# Patient Record
Sex: FEMALE | Race: WHITE
[De-identification: names, ages, dates, MRNs, and addresses within clinical notes are randomized per-mention and may not be internally consistent; named-entity substitution may affect disease eponyms.]

---

## 2018-07-28 ENCOUNTER — HOSPITAL ENCOUNTER (EMERGENCY)
Dept: HOSPITAL 97 - ER | Age: 23
Discharge: HOME | End: 2018-07-28
Payer: COMMERCIAL

## 2018-07-28 DIAGNOSIS — R07.9: Primary | ICD-10-CM

## 2018-07-28 DIAGNOSIS — F90.9: ICD-10-CM

## 2018-07-28 LAB
ALBUMIN SERPL BCP-MCNC: 4 G/DL (ref 3.4–5)
ALP SERPL-CCNC: 41 U/L (ref 45–117)
ALT SERPL W P-5'-P-CCNC: 24 U/L (ref 12–78)
AST SERPL W P-5'-P-CCNC: 18 U/L (ref 15–37)
BUN BLD-MCNC: 10 MG/DL (ref 7–18)
GLUCOSE SERPLBLD-MCNC: 91 MG/DL (ref 74–106)
HCT VFR BLD CALC: 38.9 % (ref 36–45)
INR BLD: 1.02
LYMPHOCYTES # SPEC AUTO: 2.2 K/UL (ref 0.7–4.9)
MAGNESIUM SERPL-MCNC: 2.1 MG/DL (ref 1.8–2.4)
MCH RBC QN AUTO: 32.8 PG (ref 27–35)
MCV RBC: 93.3 FL (ref 80–100)
METHAMPHET UR QL SCN: NEGATIVE
NT-PROBNP SERPL-MCNC: 55 PG/ML (ref ?–125)
PMV BLD: 9.3 FL (ref 7.6–11.3)
POTASSIUM SERPL-SCNC: 3.8 MMOL/L (ref 3.5–5.1)
RBC # BLD: 4.17 M/UL (ref 3.86–4.86)
THC SERPL-MCNC: NEGATIVE NG/ML

## 2018-07-28 PROCEDURE — 85610 PROTHROMBIN TIME: CPT

## 2018-07-28 PROCEDURE — 71045 X-RAY EXAM CHEST 1 VIEW: CPT

## 2018-07-28 PROCEDURE — 85025 COMPLETE CBC W/AUTO DIFF WBC: CPT

## 2018-07-28 PROCEDURE — 83880 ASSAY OF NATRIURETIC PEPTIDE: CPT

## 2018-07-28 PROCEDURE — 84484 ASSAY OF TROPONIN QUANT: CPT

## 2018-07-28 PROCEDURE — 81003 URINALYSIS AUTO W/O SCOPE: CPT

## 2018-07-28 PROCEDURE — 99285 EMERGENCY DEPT VISIT HI MDM: CPT

## 2018-07-28 PROCEDURE — 93005 ELECTROCARDIOGRAM TRACING: CPT

## 2018-07-28 PROCEDURE — 80307 DRUG TEST PRSMV CHEM ANLYZR: CPT

## 2018-07-28 PROCEDURE — 83735 ASSAY OF MAGNESIUM: CPT

## 2018-07-28 PROCEDURE — 85730 THROMBOPLASTIN TIME PARTIAL: CPT

## 2018-07-28 PROCEDURE — 80076 HEPATIC FUNCTION PANEL: CPT

## 2018-07-28 PROCEDURE — 36415 COLL VENOUS BLD VENIPUNCTURE: CPT

## 2018-07-28 PROCEDURE — 81025 URINE PREGNANCY TEST: CPT

## 2018-07-28 PROCEDURE — 80048 BASIC METABOLIC PNL TOTAL CA: CPT

## 2018-07-28 NOTE — ER
Nurse's Notes                                                                                     

 [facility]                                                                

Name: Marie Monroe                                                                              

Age: 23 yrs                                                                                       

Sex: Female                                                                                       

: 1995                                                                                   

MRN: [id number]                                                                                   

Arrival Date: 2018                                                                          

Time: :                                                                                       

Account#: [id number]                                                                            

Bed 8                                                                                             

Private MD: Moe Callejas                                                                        

Diagnosis: Chest pain, unspecified                                                                

                                                                                                  

Presentation:                                                                                     

                                                                                             

09:31 Presenting complaint: Patient states: intermittent sharp pain under L breast that is    ss  

      worse when taking a deep breath x 1-2 months. Pt reports that her PCP believed that it      

      may be reflux so she took her first dose of antacid yesterday. Pt had the pain today,       

      but when she was on her way to eat breakfast suddenly became weak all over that lasted      

      30 seconds- 1 minute which made her decide to be evaluated in ER today. Transition of       

      care: patient was not received from another setting of care. Onset of symptoms was 2018. Risk Assessment: Do you want to hurt yourself or someone else? Patient            

      reports no desire to harm self or others. Initial Sepsis Screen: Does the patient meet      

      any 2 criteria? No. Patient's initial sepsis screen is negative. Does the patient have      

      a suspected source of infection? No. Patient's initial sepsis screen is negative. Care      

      prior to arrival: None.                                                                     

09:31 Method Of Arrival: Ambulatory                                                           ss  

09:31 Acuity: ELBA 3                                                                           ss  

                                                                                                  

Triage Assessment:                                                                                

10:10 General: Appears in no apparent distress. comfortable, Behavior is cooperative,         ph  

      appropriate for age, anxious. Pain: Complains of pain in diaphragm, left lateral            

      anterior chest and left breast Quality of pain is described as sharp, Aggravated by         

      repositioning, deep breathing. Neuro: Level of Consciousness is awake, alert, obeys         

      commands, Oriented to person, place, time, situation. Cardiovascular: Reports chest         

      pain, palpitations, shortness of breath, Denies fatigue, nausea, vomiting, Capillary        

      refill < 3 seconds in bilateral fingers Patient's skin is warm and dry. Rhythm is sinus     

      rhythm Chest pain quality is sharp, is located in left anterior chest wall is               

      aggravated by breathing. Respiratory: Reports pain with movement pain with respiration      

      Airway is patent Respiratory effort is even, unlabored, Respiratory pattern is regular,     

      symmetrical, Denies cough. Derm: Skin is intact, is healthy with good turgor, Skin is       

      pink, warm \T\ dry. Musculoskeletal: Circulation, motion, and sensation intact. Range of    

      motion: intact in all extremities.                                                          

                                                                                                  

Historical:                                                                                       

- Allergies:                                                                                      

09:34 No Known Allergies;                                                                     ss  

- Home Meds:                                                                                      

09:34 vyvanse (currently on hold due to CP symptoms per PCP) [Active];                        ss  

09:35 birth control [Active];                                                                 ss  

- PMHx:                                                                                           

09:34 ADD/ADHD;                                                                               ss  

- PSHx:                                                                                           

09:34 None;                                                                                   ss  

                                                                                                  

- Immunization history:: Adult Immunizations up to date.                                          

- Social history:: Smoking status: Patient/guardian denies using tobacco.                         

- Ebola Screening: : Patient denies exposure to infectious person Patient denies travel           

  to an Ebola-affected area in the 21 days before illness onset.                                  

                                                                                                  

                                                                                                  

Screening:                                                                                        

10:10 Abuse screen: Denies threats or abuse. Denies injuries from another. Nutritional        ph  

      screening: No deficits noted. Tuberculosis screening: No symptoms or risk factors           

      identified. Fall Risk None identified.                                                      

                                                                                                  

Assessment:                                                                                       

10:17 General: See triage assessment.                                                         ph  

11:20 Reassessment: Patient appears in no apparent distress at this time. Patient and/or      ph  

      family updated on plan of care and expected duration. Pain level reassessed. Patient is     

      alert, oriented x 3, equal unlabored respirations, skin warm/dry/pink. Pt ambulated to      

      restroom, gait steady, denies dizziness, denies pain at this time, awaiting radiology       

      results, family at bedside, VSS.                                                            

12:30 Reassessment: Patient appears in no apparent distress at this time. Patient and/or      ph  

      family updated on plan of care and expected duration. Pain level reassessed. Patient is     

      alert, oriented x 3, equal unlabored respirations, skin warm/dry/pink. Pt d/c home with     

      family Patient denies pain at this time. Patient states feeling better.                     

                                                                                                  

Vital Signs:                                                                                      

09:34  / 89; Pulse 93; Resp 16; Pulse Ox 100% ; Weight 57.15 kg; Height 5 ft. 3 in.     ss  

      (160.02 cm); Pain 2/10;                                                                     

09:45 Temp 98.2(TE);                                                                          ss  

10:19  / 98; Pulse 85; Resp 22; Pulse Ox 100% on R/A;                                   ph  

11:22  / 82; Pulse 69; Resp 18; Pulse Ox 99% on R/A; Pain 0/10;                         ph  

12:30  / 78; Pulse 72; Resp 18; Temp 98.0; Pulse Ox 99% on R/A;                         ph  

09:34 Body Mass Index 22.32 (57.15 kg, 160.02 cm)                                             ss  

                                                                                                  

Vitals:                                                                                           

10:19 Cardiac Rhythm Assessment Sinus rhythm.                                                 ph  

                                                                                                  

ED Course:                                                                                        

09:23 Patient arrived in ED.                                                                  as  

09:23 Moe Callejas MD is Private Physician.                                                as  

09:31 Gerardo Camarena MD is Attending Physician.                                              kdr 

09:33 Triage completed.                                                                       ss  

09:34 Arm band placed on right wrist.                                                         ss  

09:39 EKG done, by ED staff, reviewed by Gerardo Camarena MD.                                    em1 

09:44 Jocelyn Gomez, RN is Primary Nurse.                                                    ph  

09:49 XRAY Chest (1 view) In Process Unspecified.                                             EDMS

10:14 Inserted saline lock: 22 gauge in left antecubital area, using aseptic technique. Blood ss  

      collected. Patient maintains SpO2 saturation greater than 95% on room air.                  

10:18 Patient has correct armband on for positive identification. Placed in gown. Bed in low  ph  

      position. Call light in reach. Side rails up X 1. Cardiac monitor on. Pulse ox on. NIBP     

      on. Warm blanket given. Verbal reassurance given.                                           

12:36 No provider procedures requiring assistance completed. IV discontinued, intact,         ph  

      bleeding controlled, No redness/swelling at site. Pressure dressing applied.                

                                                                                                  

Administered Medications:                                                                         

No medications were administered                                                                  

                                                                                                  

                                                                                                  

Outcome:                                                                                          

12:13 Discharge ordered by MD.                                                                kdr 

12:36 Patient left the ED.                                                                    ph  

12:36 Discharged to home ambulatory, with family.                                             ph  

12:36 Condition: good                                                                             

12:36 Discharge instructions given to patient, Instructed on discharge instructions, follow       

      up and referral plans. medication usage, Demonstrated understanding of instructions,        

      follow-up care, medications, Prescriptions given X 2.                                       

                                                                                                  

Signatures:                                                                                       

Dispatcher [employer]                           EDMS                                                 

Gerardo Camarena MD MD   kdr                                                  

Adeline Nolasco Eric                               em1                                                  

Karin Rhodes, YAMEL                      RN                                                      

Jocelyn Gomez, YAMEL                      RN   ph                                                   

                                                                                                  

**************************************************************************************************

## 2018-07-28 NOTE — EDPHYS
Physician Documentation                                                                           

 [facility]                                                                

Name: Marie Monroe                                                                              

Age: 23 yrs                                                                                       

Sex: Female                                                                                       

: 1995                                                                                   

MRN: [id number]                                                                                   

Arrival Date: 2018                                                                          

Time: 09:23                                                                                       

Account#: [id number]                                                                            

Bed 8                                                                                             

Private MD: Moe Callejas                                                                        

ED Physician Gerardo Camarena                                                                       

HPI:                                                                                              

                                                                                             

16:37 This 23 yrs old  Female presents to ER via Ambulatory with complaints of Chest kdr 

      Pain.                                                                                       

16:37 The patient or guardian reports chest pain that is located primarily in the anterior    kdr 

      chest wall, At the inferior costal margin below nipple. The pain does not radiate.          

      Associated signs and symptoms: The patient has no apparent associated signs or              

      symptoms, Pertinent positives: Hard to get a full breath. Began with mild discomfort        

      then she felt a pop and the pain became much worse.. The chest pain is described as         

      sharp, stabbing. Duration: The patient or guardian reports multiple episodes, that are      

      intermittent, that wax and wane, with no pattern. Modifying factors: The symptoms are       

      alleviated by remaining still, Shallow breathing, holding still. the symptoms are           

      aggravated by breathing, cough, deep breath, movement, twisting torso. Severity of          

      pain: At its worst the pain was moderate severe just prior to arrival, in the emergency     

      department the pain has improved moderately. The patient has experienced similar            

      episodes in the past, The has been ongoing for some time but not as bad or persistent       

      as today..                                                                                  

                                                                                                  

Historical:                                                                                       

- Allergies:                                                                                      

09:34 No Known Allergies;                                                                     ss  

- Home Meds:                                                                                      

09:34 vyvanse (currently on hold due to CP symptoms per PCP) [Active];                        ss  

09:35 birth control [Active];                                                                 ss  

- PMHx:                                                                                           

09:34 ADD/ADHD;                                                                               ss  

- PSHx:                                                                                           

09:34 None;                                                                                   ss  

                                                                                                  

- Immunization history:: Adult Immunizations up to date.                                          

- Social history:: Smoking status: Patient/guardian denies using tobacco.                         

- Ebola Screening: : Patient denies exposure to infectious person Patient denies travel           

  to an Ebola-affected area in the 21 days before illness onset.                                  

                                                                                                  

                                                                                                  

ROS:                                                                                              

16:37 Constitutional: Negative for fever, chills, and weight loss, Eyes: Negative for injury, kdr 

      pain, redness, and discharge, ENT: Negative for injury, pain, and discharge, Neck:          

      Negative for injury, pain, and swelling, Respiratory: Negative for shortness of breath,     

      cough, wheezing, and pleuritic chest pain, Abdomen/GI: Negative for abdominal pain,         

      nausea, vomiting, diarrhea, and constipation, Back: Negative for injury and pain, :       

      Negative for injury, bleeding, discharge, and swelling, MS/Extremity: Negative for          

      injury and deformity, Skin: Negative for injury, rash, and discoloration, Neuro:            

      Negative for headache, weakness, numbness, tingling, and seizure activity. Psych:           

      Negative for depression, anxiety, suicide ideation, homicidal ideation, and                 

      hallucinations, Allergy/Immunology: Negative for hives, rash, and allergies, Endocrine:     

      Negative for neck swelling, polydipsia, polyuria, polyphagia, and marked weight             

      changes, Hematologic/Lymphatic: Negative for swollen nodes, abnormal bleeding, and          

      unusual bruising.                                                                           

16:37 Cardiovascular: Positive for chest pain, with cough, with movement, of the left             

      anterior inferior chest pain, Negative for edema, orthopnea, palpitations, paroxysmal       

      nocturnal dyspnea.                                                                          

                                                                                                  

Exam:                                                                                             

16:37 Constitutional:  This is a well developed, well nourished patient who is awake, alert,  kdr 

      and in no acute distress. Head/Face:  Normocephalic, atraumatic. Eyes:  Pupils equal        

      round and reactive to light, extra-ocular motions intact.  Lids and lashes normal.          

      Conjunctiva and sclera are non-icteric and not injected.  Cornea within normal limits.      

      Periorbital areas with no swelling, redness, or edema. Neck:  Trachea midline, no           

      thyromegaly or masses palpated, and no cervical lymphadenopathy.  Supple, full range of     

      motion without nuchal rigidity, or vertebral point tenderness.  No Meningismus.             

      Cardiovascular:  Regular rate and rhythm with a normal S1 and S2.  No gallops, murmurs,     

      or rubs.  Normal PMI, no JVD.  No pulse deficits. Respiratory:  Lungs have equal breath     

      sounds bilaterally, clear to auscultation and percussion.  No rales, rhonchi or wheezes     

      noted.  No increased work of breathing, no retractions or nasal flaring. Abdomen/GI:        

      Soft, non-tender, with normal bowel sounds.  No distension or tympany.  No guarding or      

      rebound.  No evidence of tenderness throughout. Back:  No spinal tenderness.  No            

      costovertebral tenderness.  Full range of motion. Skin:  Warm, dry with normal turgor.      

      Normal color with no rashes, no lesions, and no evidence of cellulitis. MS/ Extremity:      

      Pulses equal, no cyanosis.  Neurovascular intact.  Full, normal range of motion. Neuro:     

       Awake and alert, GCS 15, oriented to person, place, time, and situation.  Cranial          

      nerves II-XII grossly intact.  Motor strength 5/5 in all extremities.  Sensory grossly      

      intact.  Cerebellar exam normal.  Normal gait. Psych:  Awake, alert, with orientation       

      to person, place and time.  Behavior, mood, and affect are within normal limits.            

16:37 Chest/axilla: Inspection: normal, Palpation: tenderness, that is mild, of the  Left         

      anterior inferior/subcostal margin.                                                         

                                                                                                  

Vital Signs:                                                                                      

09:34  / 89; Pulse 93; Resp 16; Pulse Ox 100% ; Weight 57.15 kg; Height 5 ft. 3 in.     ss  

      (160.02 cm); Pain 2/10;                                                                     

09:45 Temp 98.2(TE);                                                                          ss  

10:19  / 98; Pulse 85; Resp 22; Pulse Ox 100% on R/A;                                   ph  

11:22  / 82; Pulse 69; Resp 18; Pulse Ox 99% on R/A; Pain 0/10;                         ph  

12:30  / 78; Pulse 72; Resp 18; Temp 98.0; Pulse Ox 99% on R/A;                         ph  

09:34 Body Mass Index 22.32 (57.15 kg, 160.02 cm)                                             ss  

                                                                                                  

MDM:                                                                                              

12:13 Patient medically screened.                                                             kdr 

16:37 Data reviewed: vital signs, nurses notes, lab test result(s), radiologic studies.       kdr 

      Counseling: I had a detailed discussion with the patient and/or guardian regarding: the     

      historical points, exam findings, and any diagnostic results supporting the                 

      discharge/admit diagnosis, lab results, radiology results.                                  

                                                                                                  

                                                                                             

09:32 Order name: Basic Metabolic Panel; Complete Time: 11:37                                 kdr 

                                                                                             

09:32 Order name: CBC with Diff; Complete Time: 11:37                                         kdr 

                                                                                             

09:32 Order name: LFT's; Complete Time: 11:37                                                 kdr 

                                                                                             

09:32 Order name: Magnesium; Complete Time: 11:37                                             kdr 

                                                                                             

09:32 Order name: NT PRO-BNP; Complete Time: 11:37                                            kdr 

                                                                                             

09:32 Order name: PT-INR; Complete Time: 11:37                                                kdr 

                                                                                             

09:32 Order name: Ptt, Activated; Complete Time: 11:37                                        [facility] 

                                                                                             

09:32 Order name: Troponin (emerg Dept Use Only); Complete Time: 11:37                        [facility] 

                                                                                             

09:32 Order name: XRAY Chest (1 view)                                                         [facility] 

                                                                                             

09:32 Order name: EKG; Complete Time: 09:33                                                   [facility] 

                                                                                             

09:32 Order name: Cardiac monitoring; Complete Time: 10:14                                    [facility] 

                                                                                             

09:32 Order name: UDS; Complete Time: 11:37                                                   [facility] 

                                                                                             

10:02 Order name: Urine Dipstick--Ancillary (enter results); Complete Time: 11:37               

                                                                                             

10:02 Order name: Urine Pregnancy--Ancillary (enter results); Complete Time: 11:37              

                                                                                             

09:32 Order name: EKG - Nurse/Tech; Complete Time: 09:38                                      [facility] 

                                                                                             

09:32 Order name: IV Saline Lock; Complete Time: 10:14                                        [facility] 

                                                                                             

09:32 Order name: Labs collected and sent; Complete Time: 10:14                               [facility] 

                                                                                             

09:32 Order name: O2 Per Protocol; Complete Time: 10:14                                       [facility] 

                                                                                             

09:32 Order name: O2 Sat Monitoring; Complete Time: 10:14                                     [facility] 

                                                                                             

09:32 Order name: Urine Dipstick-Ancillary (obtain specimen); Complete Time: 10:14            kdr 

                                                                                                  

Administered Medications:                                                                         

No medications were administered                                                                  

                                                                                                  

                                                                                                  

Disposition:                                                                                      

18 12:13 Discharged to Home. Impression: Chest pain, unspecified.                           

- Condition is Stable.                                                                            

- Discharge Instructions: Chest Wall Pain, Easy-to-Read, Nonspecific Chest Pain,                  

  Easy-to-Read.                                                                                   

- Prescriptions for Tramadol 50 mg Oral Tablet - take 1 tablet by ORAL route every 8              

  hours as needed; 12 tablet. Ibuprofen 600 mg Oral Tablet - take 1 tablet by ORAL                

  route every 6 hours As needed take with food; 15 tablet.                                        

- Medication Reconciliation Form, Thank You Letter form.                                          

- Follow up: Private Physician; When: As needed; Reason: If symptoms return, Further              

  diagnostic work-up, Recheck today's complaints, Continuance of care, Re-evaluation by           

  your physician.                                                                                 

- Problem is new.                                                                                 

- Symptoms have improved.                                                                         

                                                                                                  

                                                                                                  

                                                                                                  

Signatures:                                                                                       

Dispatcher [employer]                           EDGerardo Alvarado MD MD   kdr                                                  

Karin Rhodes, RN                      RN   ss                                                   

Jocelyn Gomez RN                      RN   ph                                                   

                                                                                                  

Corrections: (The following items were deleted from the chart)                                    

12:36 12:13 2018 12:13 Discharged to Home. Impression: Chest pain, unspecified.         ph  

      Condition is Stable. Forms are Medication Reconciliation Form, Thank You Letter,            

      Antibiotic Education, Prescription Opioid Use. Follow up: Private Physician; When: As       

      needed; Reason: If symptoms return, Further diagnostic work-up, Recheck today's             

      complaints, Continuance of care, Re-evaluation by your physician. Problem is new.           

      Symptoms have improved. kdr                                                                 

                                                                                                  

**************************************************************************************************

## 2018-07-28 NOTE — RAD REPORT
EXAM DESCRIPTION:  RAD - Chest Single View - 7/28/2018 9:49 am

 

CLINICAL HISTORY:  CHEST PAIN

Chest pain.

 

COMPARISON:  No comparisons

 

FINDINGS:  Portable technique limits examination quality.

 

The lungs are grossly clear. The heart is normal in size. No displaced fractures.

 

IMPRESSION:  No acute intrathoracic process suspected.

## 2018-07-29 NOTE — EKG
Test Date:    2018-07-28               Test Time:    09:32:58

Technician:   MARQUES                                    

                                                     

MEASUREMENT RESULTS:                                       

Intervals:                                           

Rate:         91                                     

MA:           126                                    

QRSD:         88                                     

QT:           378                                    

QTc:          464                                    

Axis:                                                

P:            78                                     

MA:           126                                    

QRS:          40                                     

T:            40                                     

                                                     

INTERPRETIVE STATEMENTS:                                       

                                                     

Normal sinus rhythm with sinus arrhythmia

Normal ECG

No previous ECG available for comparison



Electronically Signed On 07-29-18 09:33:38 CDT by Robel Garcia